# Patient Record
Sex: MALE | Race: WHITE | HISPANIC OR LATINO
[De-identification: names, ages, dates, MRNs, and addresses within clinical notes are randomized per-mention and may not be internally consistent; named-entity substitution may affect disease eponyms.]

---

## 2018-05-31 PROBLEM — Z00.00 ENCOUNTER FOR PREVENTIVE HEALTH EXAMINATION: Status: ACTIVE | Noted: 2018-05-31

## 2018-06-19 ENCOUNTER — APPOINTMENT (OUTPATIENT)
Dept: UROLOGY | Facility: CLINIC | Age: 73
End: 2018-06-19

## 2020-10-06 ENCOUNTER — RECORD ABSTRACTING (OUTPATIENT)
Age: 75
End: 2020-10-06

## 2020-10-06 DIAGNOSIS — H60.63 UNSPECIFIED CHRONIC OTITIS EXTERNA, BILATERAL: ICD-10-CM

## 2020-10-06 DIAGNOSIS — Z87.09 PERSONAL HISTORY OF OTHER DISEASES OF THE RESPIRATORY SYSTEM: ICD-10-CM

## 2020-10-06 DIAGNOSIS — Z86.79 PERSONAL HISTORY OF OTHER DISEASES OF THE CIRCULATORY SYSTEM: ICD-10-CM

## 2020-10-06 DIAGNOSIS — Z78.9 OTHER SPECIFIED HEALTH STATUS: ICD-10-CM

## 2020-10-06 DIAGNOSIS — H69.91 UNSPECIFIED EUSTACHIAN TUBE DISORDER, RIGHT EAR: ICD-10-CM

## 2020-10-06 DIAGNOSIS — J34.2 DEVIATED NASAL SEPTUM: ICD-10-CM

## 2020-10-06 DIAGNOSIS — Z86.39 PERSONAL HISTORY OF OTHER ENDOCRINE, NUTRITIONAL AND METABOLIC DISEASE: ICD-10-CM

## 2020-10-06 RX ORDER — AZELASTINE HYDROCHLORIDE AND FLUTICASONE PROPIONATE 137; 50 UG/1; UG/1
137-50 SPRAY, METERED NASAL
Refills: 0 | Status: ACTIVE | COMMUNITY

## 2020-10-06 RX ORDER — ALLOPURINOL 500 MG/25ML
500 INJECTION, POWDER, LYOPHILIZED, FOR SOLUTION INTRAVENOUS
Refills: 0 | Status: ACTIVE | COMMUNITY

## 2020-10-06 RX ORDER — LEVOTHYROXINE SODIUM 0.05 MG/1
50 TABLET ORAL
Refills: 0 | Status: ACTIVE | COMMUNITY

## 2020-10-07 ENCOUNTER — APPOINTMENT (OUTPATIENT)
Dept: OTOLARYNGOLOGY | Facility: CLINIC | Age: 75
End: 2020-10-07
Payer: MEDICARE

## 2020-10-07 VITALS
DIASTOLIC BLOOD PRESSURE: 77 MMHG | TEMPERATURE: 97.4 F | SYSTOLIC BLOOD PRESSURE: 146 MMHG | HEIGHT: 65 IN | BODY MASS INDEX: 24.32 KG/M2 | WEIGHT: 146 LBS

## 2020-10-07 DIAGNOSIS — H92.09 OTALGIA, UNSPECIFIED EAR: ICD-10-CM

## 2020-10-07 DIAGNOSIS — M26.609 UNSPECIFIED TEMPOROMANDIBULAR JOINT DISORDER: ICD-10-CM

## 2020-10-07 DIAGNOSIS — H92.01 OTALGIA, RIGHT EAR: ICD-10-CM

## 2020-10-07 PROCEDURE — 31575 DIAGNOSTIC LARYNGOSCOPY: CPT

## 2020-10-07 PROCEDURE — 99204 OFFICE O/P NEW MOD 45 MIN: CPT | Mod: 25

## 2020-10-07 NOTE — PHYSICAL EXAM
[Normal] : mucosa is normal [Midline] : trachea located in midline position [de-identified] : parotid gland on the right slightly asymetrical [de-identified] : mild discomfort/ missing a few teeth

## 2020-10-07 NOTE — REVIEW OF SYSTEMS
[Ear Pain] : ear pain [Ear Itch] : ear itch [Throat Pain] : throat pain [Heartburn] : heartburn [Negative] : Heme/Lymph [Patient Intake Form Reviewed] : Patient intake form was reviewed

## 2020-10-07 NOTE — ASSESSMENT
[FreeTextEntry1] : symptoms atypical\par TMJ \par asymetrical parotid ?\par CT neck\par GERD instructions\par dental exam\par f/u after above\par TMJ instructions

## 2022-09-01 ENCOUNTER — APPOINTMENT (OUTPATIENT)
Dept: OTOLARYNGOLOGY | Facility: CLINIC | Age: 77
End: 2022-09-01

## 2022-09-01 VITALS
HEIGHT: 65 IN | BODY MASS INDEX: 24.32 KG/M2 | HEART RATE: 54 BPM | WEIGHT: 146 LBS | SYSTOLIC BLOOD PRESSURE: 120 MMHG | DIASTOLIC BLOOD PRESSURE: 72 MMHG

## 2022-09-01 DIAGNOSIS — R60.9 EDEMA, UNSPECIFIED: ICD-10-CM

## 2022-09-01 PROCEDURE — 92567 TYMPANOMETRY: CPT

## 2022-09-01 PROCEDURE — 92557 COMPREHENSIVE HEARING TEST: CPT

## 2022-09-01 PROCEDURE — 31575 DIAGNOSTIC LARYNGOSCOPY: CPT

## 2022-09-01 PROCEDURE — 99214 OFFICE O/P EST MOD 30 MIN: CPT | Mod: 25

## 2022-09-01 RX ORDER — OMEPRAZOLE 20 MG/1
TABLET, DELAYED RELEASE ORAL
Refills: 0 | Status: ACTIVE | COMMUNITY

## 2022-09-01 RX ORDER — ROSUVASTATIN CALCIUM 5 MG/1
5 TABLET, FILM COATED ORAL
Refills: 0 | Status: ACTIVE | COMMUNITY

## 2022-09-01 RX ORDER — FAMOTIDINE 20 MG/1
20 TABLET, FILM COATED ORAL
Qty: 90 | Refills: 0 | Status: ACTIVE | COMMUNITY
Start: 2022-09-01 | End: 1900-01-01

## 2022-09-01 NOTE — DATA REVIEWED
[de-identified] : - type A tymps au\par hearing wnl sloping to a severe snhl [id number] au\par rec: 1) ent f/u 2) re-eval as per md 3) binaural amplification

## 2022-09-01 NOTE — PHYSICAL EXAM
[Midline] : trachea located in midline position [Laryngoscopy Performed] : laryngoscopy was performed, see procedure section for findings [Normal] : no rashes [de-identified] : Mild right tail of parotid fullness

## 2022-09-01 NOTE — ASSESSMENT
[FreeTextEntry1] : Mark Soto presents for hearing evaluation. Otoscopic exam was normal. Audiogram was performed and reviewed, showing type A tymps AU and bilateral normal sloping to severe SNHL. There is an asymmetry in hearing with the right ear being the worse ear on pure tone thresholds. Will obtain MRI brain/IAC for further evaluation. Discussed amplification with him and he will consider this.\par \par Regarding his right parotid fullness, this was noted in Oct 2020 by Dr. Garcia. Right tail of parotid fullness is noted on exam today. Patient will obtain CT neck as previously ordered.\par \par In addition, he has history of laryngopharyngeal reflux and has been asymptomatic since starting omeprazole. Flexible laryngoscopy today shows minimal postcricoid inflammation. Will switch from omeprazole to famotidine and he will start antireflux  lifestyle/dietary changes.\par \par - Antireflux lifestyle and dietary changes.\par - Stop omeprazole. Start famotidine 20 mg qhs.\par - CT neck with contrast.\par - MRI brain/IAC\par - Follow up after imaging.

## 2022-09-01 NOTE — HISTORY OF PRESENT ILLNESS
[de-identified] : Mark Soto is a 76 yo male who presents for evaluation of hearing. He was last seen in our office by Dr. 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 He did not obtain his CT. He was also started on reflux precautions. He denies any heartburn, dysphagia, odynophagia, or globus. He denies any further ear pain after he was seen. He does not believe his right parotid is swollen. He denies facial weakness or numbness. He denies fevers, unintentional weight loss, or neck masses.\par \par Today, he presents because his wife feels he cannot hear her. He deneis otalgia, otorrhea, tinnitus, or vertigo. He denies subjective hearing change. He notes occasional headaches. He denies family history of hearing loss or occupational noise exposure. He denies exposure to ototoxic medications. He occasionally takes aspirin.\par \par He takes omeprazole for reflux.

## 2022-10-07 ENCOUNTER — APPOINTMENT (OUTPATIENT)
Dept: OTOLARYNGOLOGY | Facility: CLINIC | Age: 77
End: 2022-10-07

## 2022-10-07 VITALS
HEIGHT: 65 IN | SYSTOLIC BLOOD PRESSURE: 129 MMHG | DIASTOLIC BLOOD PRESSURE: 70 MMHG | BODY MASS INDEX: 24.32 KG/M2 | WEIGHT: 146 LBS | HEART RATE: 57 BPM

## 2022-10-07 PROCEDURE — 99213 OFFICE O/P EST LOW 20 MIN: CPT

## 2022-10-07 RX ORDER — OMEPRAZOLE 20 MG/1
20 CAPSULE, DELAYED RELEASE ORAL
Qty: 90 | Refills: 0 | Status: ACTIVE | COMMUNITY
Start: 2022-10-07 | End: 1900-01-01

## 2022-10-07 NOTE — ASSESSMENT
[FreeTextEntry1] : Mark Soto presents for follow-up. Regarding his hearing, he has bilateral sensorineural hearing loss on previous audiogram. MRI brain/IAC showed no evidence of CPA or IAC mass. Will refer for hearing aid evaluation.\par \par Regarding his right parotid fullness, CT neck was negative for any mass or lesion.\par \par Finally, regarding his laryngopharyngeal reflux, he became symptomatic when off of omeprazole and on famotidine. Will restart omeprazole and refer him to GI for further evaluation.\par \par - Omeprazole 20 mg qd.\par - Hearing aid evaluation.\par - GI referral\par - Follow up in 3 months

## 2022-10-07 NOTE — HISTORY OF PRESENT ILLNESS
[de-identified] : Mark Soto is a 78 yo male who presents for evaluation of hearing. He was last seen in our office by Dr. 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 He did not obtain his CT. He was also started on reflux precautions. He denies any heartburn, dysphagia, odynophagia, or globus. He denies any further ear pain after he was seen. He does not believe his right parotid is swollen. He denies facial weakness or numbness. He denies fevers, unintentional weight loss, or neck masses.\par \par Today, he presents because his wife feels he cannot hear her. He deneis otalgia, otorrhea, tinnitus, or vertigo. He denies subjective hearing change. He notes occasional headaches. He denies family history of hearing loss or occupational noise exposure. He denies exposure to ototoxic medications. He occasionally takes aspirin.\par \par He takes omeprazole for reflux. [FreeTextEntry1] : 10/7/22 - Mr. Soto presents for follow-up. He notes he had heartburn when taking famotidine and switched back to omeprazole. He has been implementing antireflux guidelines. He denies dysphagia or odynophagia. He deneis any change in right parotid fullness. He has not gone for hearing aids. He denies any change in his hearing. He dneies recent fevers or chills.

## 2022-10-07 NOTE — PHYSICAL EXAM
[Midline] : trachea located in midline position [Normal] : no rashes [de-identified] : Mild right tail of parotid fullness

## 2022-10-07 NOTE — REASON FOR VISIT
[Subsequent Evaluation] : a subsequent evaluation for [FreeTextEntry2] : imaging, laryngopharyngeal reflux

## 2023-01-13 ENCOUNTER — APPOINTMENT (OUTPATIENT)
Dept: OTOLARYNGOLOGY | Facility: CLINIC | Age: 78
End: 2023-01-13

## 2023-05-11 ENCOUNTER — APPOINTMENT (OUTPATIENT)
Dept: ORTHOPEDIC SURGERY | Facility: CLINIC | Age: 78
End: 2023-05-11
Payer: MEDICARE

## 2023-05-11 DIAGNOSIS — M75.112 INCOMPLETE ROTATOR CUFF TEAR OR RUPTURE OF LEFT SHOULDER, NOT SPECIFIED AS TRAUMATIC: ICD-10-CM

## 2023-05-11 PROCEDURE — 73030 X-RAY EXAM OF SHOULDER: CPT | Mod: LT

## 2023-05-11 PROCEDURE — 99203 OFFICE O/P NEW LOW 30 MIN: CPT | Mod: 25

## 2023-05-11 PROCEDURE — 20610 DRAIN/INJ JOINT/BURSA W/O US: CPT | Mod: LT

## 2023-05-11 PROCEDURE — J3490M: CUSTOM | Mod: LT

## 2023-05-11 PROCEDURE — 73010 X-RAY EXAM OF SHOULDER BLADE: CPT | Mod: LT

## 2023-05-11 RX ORDER — LOSARTAN POTASSIUM 25 MG/1
25 TABLET, FILM COATED ORAL
Refills: 0 | Status: ACTIVE | COMMUNITY

## 2023-05-11 NOTE — HISTORY OF PRESENT ILLNESS
[10] : 10 [de-identified] : 78RHD male here with complaints of left shoulder pain. He had a fall 4/21. He landed on his left side. he has continued pain and weakness to the left side. \par \par Hx R RCR 8 years ago recovered wel\par PMH: Pre diabetes [FreeTextEntry1] : lt shoulder [FreeTextEntry5] : pt fell on 04/21/23 on the stairs and states he fell on lt side

## 2023-05-11 NOTE — IMAGING
[Left] : left shoulder [There are no fractures, subluxations or dislocations. No significant abnormalities are seen] : There are no fractures, subluxations or dislocations. No significant abnormalities are seen [de-identified] : No swelling, no ecchymosis, no corina deformity\par Tenderness to palpation over greater tuberosity\par No instability or tenderness over AC joint\par 170/80/50/40\par 3/5 supraspinatus, infraspinatus and subscapularis; there is pain with strength testing\par Positive Maurice test, positive impingement sign\par Speed’s and yergason negative\par Motor and sensory intact distally\par

## 2023-05-11 NOTE — PROCEDURE
[FreeTextEntry3] : - Large joint injection was performed of the left subacromial space. \par - The indication for this procedure was pain and inflammation. Patient has tried OTC's including aspirin, Ibuprofen, Aleve, etc or prescription NSAIDS, and/or exercises at home and/or physical therapy without satisfactory response, patient had decreased mobility in the joint and the risks benefits, and alternatives have been discussed, and verbal consent was obtained. The site was prepped with alcohol, betadine, ethyl chloride sprayed topically and sterile technique used. \par - An injection of Betamethasone 2cc; Marcaine 5cc injected.\par - Patient was advised to call if redness, pain or fever occur, apply ice for 15 minutes out of every hour for the next 12-24 hours as tolerated and patient was advised to rest the joint(s) for 2 days. \par

## 2023-05-11 NOTE — ASSESSMENT
[FreeTextEntry1] : XR REVIEWED, LIKELY TRAUMATIC CUFF TEAR AFTER FALL\par CSI AND PT FOR STRENGTHETING\par FU 4 WEEKS FOR RE EVAL\par DISCUSSED MRI IF NOT IMPROVED

## 2023-06-08 ENCOUNTER — APPOINTMENT (OUTPATIENT)
Dept: ORTHOPEDIC SURGERY | Facility: CLINIC | Age: 78
End: 2023-06-08

## 2023-08-10 ENCOUNTER — APPOINTMENT (OUTPATIENT)
Dept: OTOLARYNGOLOGY | Facility: CLINIC | Age: 78
End: 2023-08-10
Payer: MEDICARE

## 2023-08-10 VITALS
WEIGHT: 144 LBS | BODY MASS INDEX: 23.99 KG/M2 | DIASTOLIC BLOOD PRESSURE: 69 MMHG | HEIGHT: 65 IN | HEART RATE: 51 BPM | SYSTOLIC BLOOD PRESSURE: 139 MMHG

## 2023-08-10 DIAGNOSIS — H90.3 SENSORINEURAL HEARING LOSS, BILATERAL: ICD-10-CM

## 2023-08-10 DIAGNOSIS — H61.23 IMPACTED CERUMEN, BILATERAL: ICD-10-CM

## 2023-08-10 DIAGNOSIS — K21.9 GASTRO-ESOPHAGEAL REFLUX DISEASE W/OUT ESOPHAGITIS: ICD-10-CM

## 2023-08-10 PROCEDURE — 69210 REMOVE IMPACTED EAR WAX UNI: CPT | Mod: 59

## 2023-08-10 PROCEDURE — 31575 DIAGNOSTIC LARYNGOSCOPY: CPT

## 2023-08-10 PROCEDURE — 99213 OFFICE O/P EST LOW 20 MIN: CPT | Mod: 25

## 2023-08-10 NOTE — HISTORY OF PRESENT ILLNESS
[de-identified] : Mark Soto is a 78 yo male who presents for evaluation of hearing. He was last seen in our office by Dr. 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 He did not obtain his CT. He was also started on reflux precautions. He denies any heartburn, dysphagia, odynophagia, or globus. He denies any further ear pain after he was seen. He does not believe his right parotid is swollen. He denies facial weakness or numbness. He denies fevers, unintentional weight loss, or neck masses.  Today, he presents because his wife feels he cannot hear her. He deneis otalgia, otorrhea, tinnitus, or vertigo. He denies subjective hearing change. He notes occasional headaches. He denies family history of hearing loss or occupational noise exposure. He denies exposure to ototoxic medications. He occasionally takes aspirin.  He takes omeprazole for reflux. [FreeTextEntry1] : 10/7/22 - Mr. Soto presents for follow-up. He notes he had heartburn when taking famotidine and switched back to omeprazole. He has been implementing antireflux guidelines. He denies dysphagia or odynophagia. He deneis any change in right parotid fullness. He has not gone for hearing aids. He denies any change in his hearing. He dneies recent fevers or chills.  8/10/23 - Ms. Soto presents for follow-up. Regarding his laryngopharyngeal reflux. he has not yet seen GI. He is still on omeprazole and implementing antireflux precautions. He denies dysphagia, odynophagia, dyspnea, dysphonia, heartburn. He denies parotid swelling. He has not gone for hearing aids. He notes right ear itching starting about a week ago. He denies otorrhea, tinnitus, vertigo. He denies change in hearing. He denies fevers or chills. He denies recent ear infections.

## 2023-08-10 NOTE — ASSESSMENT
[FreeTextEntry1] : Mark Soto presents for follow-up. He has history of bilateral sensorineural hearing loss with previous normal MRI brain/IAC. He notes right ear itching. Bilateral cerumen was removed. He cna use drop of mineral oil prn for itching. Will repeat audio in 1 month. He has not obtained hearing aids  Regarding his laryngopharyngeal reflux, he is on omeprazole refilled by his PCP. HE is implementing antireflux precautions with improvement in symptoms. He has not seen GI. Flexible laryngoscopy shows improved postcricoid inflammation. Will trial coming off of omeprazole..  - Antireflux precautions. - Drop of mineral oil prn itching. - F/u in 1 mo with audio.

## 2023-08-10 NOTE — PHYSICAL EXAM
[de-identified] : Bilateral cerumen [Midline] : trachea located in midline position [Laryngoscopy Performed] : laryngoscopy was performed, see procedure section for findings [Normal] : no rashes

## 2023-09-12 ENCOUNTER — APPOINTMENT (OUTPATIENT)
Dept: OTOLARYNGOLOGY | Facility: CLINIC | Age: 78
End: 2023-09-12

## 2024-12-02 ENCOUNTER — APPOINTMENT (OUTPATIENT)
Dept: ORTHOPEDIC SURGERY | Facility: CLINIC | Age: 79
End: 2024-12-02

## 2025-08-31 ENCOUNTER — NON-APPOINTMENT (OUTPATIENT)
Age: 80
End: 2025-08-31